# Patient Record
Sex: FEMALE | URBAN - METROPOLITAN AREA
[De-identification: names, ages, dates, MRNs, and addresses within clinical notes are randomized per-mention and may not be internally consistent; named-entity substitution may affect disease eponyms.]

---

## 2020-12-18 ENCOUNTER — TRANSFERRED RECORDS (OUTPATIENT)
Dept: HEALTH INFORMATION MANAGEMENT | Facility: CLINIC | Age: 60
End: 2020-12-18

## 2020-12-21 ENCOUNTER — TRANSFERRED RECORDS (OUTPATIENT)
Dept: HEALTH INFORMATION MANAGEMENT | Facility: CLINIC | Age: 60
End: 2020-12-21

## 2020-12-21 ENCOUNTER — TRANSCRIBE ORDERS (OUTPATIENT)
Dept: OTHER | Age: 60
End: 2020-12-21

## 2020-12-21 ENCOUNTER — MEDICAL CORRESPONDENCE (OUTPATIENT)
Dept: HEALTH INFORMATION MANAGEMENT | Facility: CLINIC | Age: 60
End: 2020-12-21

## 2020-12-21 DIAGNOSIS — D04.72: Primary | ICD-10-CM

## 2020-12-22 ENCOUNTER — TELEPHONE (OUTPATIENT)
Dept: ORTHOPEDICS | Facility: CLINIC | Age: 60
End: 2020-12-22

## 2020-12-22 NOTE — TELEPHONE ENCOUNTER
Patient has a referral to Ortho Oncology. Please call patient at [phone] to schedule appt in appropriate time frame. Thank you!

## 2020-12-22 NOTE — TELEPHONE ENCOUNTER
RECORDS RECEIVED FROM: consulting foot mass referred by Dr. Lazaro Wood MD    DATE RECEIVED: Dec 23, 2020     NOTES STATUS DETAILS   12/22/20   11:10 AM   Per Miles, images at [facility]. Called they will push images and send reports.  Latasha Hairston CMA    12/22/20   11:46 AM   Received reports, emailed them to Steven so they can be added to the images in PACS  Latasha Hairston CMA

## 2020-12-22 NOTE — TELEPHONE ENCOUNTER
This pt was referred by [facility]. [facility] called pt and scheduled virtual visit with Dr. Austin on 12/23/20 as there are no openings for in-clinic visit. The pt stated that she had a PET Scan done at [facility] in [place] and she had surgery done at [facility].             -Arjun [facility]- Orthopedics

## 2020-12-23 ENCOUNTER — PRE VISIT (OUTPATIENT)
Dept: ORTHOPEDICS | Facility: CLINIC | Age: 60
End: 2020-12-23